# Patient Record
Sex: MALE | Race: WHITE
[De-identification: names, ages, dates, MRNs, and addresses within clinical notes are randomized per-mention and may not be internally consistent; named-entity substitution may affect disease eponyms.]

---

## 2017-10-27 ENCOUNTER — HOSPITAL ENCOUNTER (OUTPATIENT)
Dept: HOSPITAL 80 - CIMAGING | Age: 77
End: 2017-10-27
Attending: FAMILY MEDICINE
Payer: COMMERCIAL

## 2017-10-27 DIAGNOSIS — M19.012: Primary | ICD-10-CM

## 2017-10-28 ENCOUNTER — HOSPITAL ENCOUNTER (EMERGENCY)
Dept: HOSPITAL 80 - CED | Age: 77
Discharge: HOME | End: 2017-10-28
Payer: COMMERCIAL

## 2017-10-28 VITALS
RESPIRATION RATE: 16 BRPM | SYSTOLIC BLOOD PRESSURE: 120 MMHG | OXYGEN SATURATION: 93 % | DIASTOLIC BLOOD PRESSURE: 86 MMHG | HEART RATE: 61 BPM

## 2017-10-28 VITALS
RESPIRATION RATE: 18 BRPM | SYSTOLIC BLOOD PRESSURE: 180 MMHG | DIASTOLIC BLOOD PRESSURE: 94 MMHG | OXYGEN SATURATION: 94 % | TEMPERATURE: 98 F

## 2017-10-28 VITALS — HEART RATE: 60 BPM

## 2017-10-28 VITALS — TEMPERATURE: 97.7 F

## 2017-10-28 DIAGNOSIS — Z87.891: ICD-10-CM

## 2017-10-28 DIAGNOSIS — R07.9: Primary | ICD-10-CM

## 2017-10-28 DIAGNOSIS — M65.352: ICD-10-CM

## 2017-10-28 DIAGNOSIS — Z85.51: ICD-10-CM

## 2017-10-28 LAB
% IMMATURE GRANULYOCYTES: 0.2 % (ref 0–1.1)
ABSOLUTE IMMATURE GRANULOCYTES: 0.01 10^3/UL (ref 0–0.1)
ABSOLUTE NRBC COUNT: 0 10^3/UL (ref 0–0.01)
ADD DIFF?: NO
ADD MORPH?: NO
ADD SCAN?: NO
ALBUMIN SERPL-MCNC: 4.4 G/DL (ref 3.5–5)
ALP SERPL-CCNC: 64 IU/L (ref 38–126)
ALT SERPL-CCNC: 38 IU/L (ref 21–72)
ANION GAP SERPL CALC-SCNC: 10 MEQ/L (ref 8–16)
APTT BLD: 31 SEC (ref 23–38)
AST SERPL-CCNC: 31 IU/L (ref 17–59)
ATYPICAL LYMPHOCYTE FLAG: 0 (ref 0–99)
BILIRUB SERPL-MCNC: 1 MG/DL (ref 0.1–1.4)
BILIRUBIN-CONJUGATED: 0.4 MG/DL (ref 0–0.5)
BILIRUBIN-UNCONJUGATED: 0.6 MG/DL (ref 0–1.1)
CALCIUM SERPL-MCNC: 9.5 MG/DL (ref 8.5–10.4)
CHLORIDE SERPL-SCNC: 106 MEQ/L (ref 97–110)
CO2 SERPL-SCNC: 23 MEQ/L (ref 22–31)
CREAT SERPL-MCNC: 0.9 MG/DL (ref 0.7–1.3)
ERYTHROCYTE [DISTWIDTH] IN BLOOD BY AUTOMATED COUNT: 14 % (ref 11.5–15.2)
FRAGMENT RBC FLAG: 0 (ref 0–99)
GFR SERPL CREATININE-BSD FRML MDRD: > 60 ML/MIN/{1.73_M2}
GLUCOSE SERPL-MCNC: 106 MG/DL (ref 70–100)
HCT VFR BLD CALC: 38.8 % (ref 40–51)
HGB BLD-MCNC: 13 G/DL (ref 13.7–17.5)
INR PPP: 0.89 (ref 0.83–1.16)
LEFT SHIFT FLG: 0 (ref 0–99)
LIPEMIA HEMOLYSIS FLAG: 80 (ref 0–99)
MCH RBC BLDCO QN: 32.5 PG (ref 27.9–34.1)
MCHC RBC AUTO-ENTMCNC: 33.5 G/DL (ref 32.4–36.7)
MCV RBC AUTO: 97 FL (ref 81.5–99.8)
NRBC-AUTO%: 0 % (ref 0–0.2)
PLATELET # BLD: 218 10^3/UL (ref 150–400)
PLATELET CLUMPS FLAG: 10 (ref 0–99)
PMV BLD AUTO: 9.9 FL (ref 8.7–11.7)
POTASSIUM SERPL-SCNC: 4.3 MEQ/L (ref 3.5–5.2)
PROT SERPL-MCNC: 7.1 G/DL (ref 6.3–8.2)
PROTHROMBIN TIME: 11.8 SEC (ref 12–15)
RBC # BLD AUTO: 4 10^6/UL (ref 4.4–6.38)
SODIUM SERPL-SCNC: 139 MEQ/L (ref 134–144)
TROPONIN I SERPL-MCNC: < 0.012 NG/ML (ref 0–0.03)

## 2017-10-28 PROCEDURE — 99285 EMERGENCY DEPT VISIT HI MDM: CPT

## 2017-10-28 PROCEDURE — 71020: CPT

## 2017-10-28 PROCEDURE — 93005 ELECTROCARDIOGRAM TRACING: CPT

## 2017-10-28 PROCEDURE — 71275 CT ANGIOGRAPHY CHEST: CPT

## 2017-10-28 NOTE — CPEKG
Heart Rate: 59

RR Interval: 1017

P-R Interval: 156

QRSD Interval: 162

QT Interval: 448

QTC Interval: 444

P Axis: 41

QRS Axis: -71

T Wave Axis: 14

EKG Severity - ABNORMAL ECG -

EKG Impression: SINUS RHYTHM

EKG Impression: ATRIAL PREMATURE COMPLEX

EKG Impression: RBBB AND LAFB

Electronically Signed By: Jeremy Lacy 28-Oct-2017 12:30:22

## 2017-10-28 NOTE — EDPHY
H & P


Stated Complaint: chest pain for 1 week


Time Seen by Provider: 10/28/17 07:05


HPI/ROS: 





CHIEF COMPLAINT:  Chest pain





HISTORY OF PRESENT ILLNESS:  The patient is a 77-year-old man who comes to the 

emergency department complaining of chest pain for about 1 week.  He states 

that his symptoms are sharp and over his left breast and intermittent.  They 

seem to be exacerbated by marijuana use.  He used marijuana yesterday evening 

for the 1st time in several days.  He states that he is trying to cut down.  He 

had significant pain yesterday evening and then a single episode this morning 

about an hour and half ago was worse than typical.  They usually only last for 

few seconds.  No shortness of breath nausea or diaphoresis associated.  No 

lightheadedness.  No worsening with exertion.  No radiation.  He does have a 

history of high cholesterol.  No hypertension.  No recent travel leg pain or 

swelling.  No shortness of breath or pleurisy.  No recent fevers or illness.  

He denies having any cardiac history although he does have right bundle branch 

block with left anterior fascicular which he knew about previously.








REVIEW OF SYSTEMS:


Constitutional:  denies: chills, fever, recent illness, recent injury


EENTM: denies: blurred vision, double vision, nose congestion


Respiratory: denies: cough, shortness of breath


Cardiac:  See HPI


Gastrointestinal/Abdominal: denies: abdominal pain, diarrhea, nausea, vomiting, 

blood streaked stools


Genitourinary: denies: dysuria, frequency, hematuria, pain


Musculoskeletal: denies: joint pain, muscle pain


Skin: denies: lesions, rash, jaundice, bruising


Neurological: denies: headache, numbness, paresthesia, tingling, dizziness, 

weakness


Hematologic/Lymphatic: denies: blood clots, easy bleeding, easy bruising


Immunologic/allergic: denies: HIV/AIDS, transplant








EXAM:


GENERAL:  Well-appearing, well-nourished and in no acute distress.


HEAD:  Atraumatic, normocephalic.


EYES:  Pupils equal round and reactive to light, extraocular movements intact, 

sclera anicteric, conjunctiva are normal.


ENT:  TMs normal, nares patent, oropharynx clear without exudates.  Moist 

mucous membranes.


NECK:  Normal range of motion, supple without lymphadenopathy or JVD.


LUNGS:  Breath sounds clear to auscultation bilaterally and equal.  No wheezes 

rales or rhonchi.


HEART:  Regular rate and rhythm without murmurs, rubs or gallops.


ABDOMEN:  Soft, nontender, normoactive bowel sounds.  No guarding, no rebound.  

No masses appreciated.


BACK:  No CVA tenderness, no spinal tenderness, step-offs or deformities


EXTREMITIES:  Normal range of motion, no pitting or edema.  No clubbing or 

cyanosis.


NEUROLOGICAL:  Cranial nerves II through XII grossly intact.  Normal speech, 

normal gait.  5/5 strength, normal movement in all extremities, normal sensation


PSYCH:  Normal mood, normal affect.


SKIN:  Warm, dry, normal turgor, no visible rashes or lesions.














Source: Patient


Exam Limitations: No limitations





- Personal History


Current Tetanus Diphtheria and Acellular Pertussis (TDAP): Yes


Tetanus Vaccine Date: unsure





- Medical/Surgical History


Hx Asthma: No


Hx Chronic Respiratory Disease: No


Hx Diabetes: No


Hx Cardiac Disease: Yes


Hx Renal Disease: No


Hx Cirrhosis: No


Hx Alcoholism: No


Hx HIV/AIDS: No


Hx Splenectomy or Spleen Trauma: No


Other PMH: tib/fib, diverticulitis, bladder CA, INFLAMMATORY POLYARTHROPATHY, 

spinal fusion L3-L4, HIGH CHOLESTEROL, GASTRIC ULCER, SINUS SURG, TONSILS,APPY,

RBBB,arthritis





- Family History


Significant Family History: No pertinent family hx





- Social History


Smoking Status: Former smoker


Alcohol Use: Sober


Drug Use: None


Constitutional: 


 Initial Vital Signs











Heart Rate  68   10/28/17 06:52


 


Respiratory Rate  18   10/28/17 06:52


 


Blood Pressure  190/100 H  10/28/17 06:52


 


O2 Sat (%)  94   10/28/17 06:52








 











O2 Delivery Mode               Room Air














Allergies/Adverse Reactions: 


 





droperidol [From Inapsine] Allergy (Intermediate, Verified 10/28/17 06:59)


 AGITATION








Home Medications: 














 Medication  Instructions  Recorded


 


HYDROcodone/APAP 10/325 [Norco 1 tab PO BID PRN 12/09/13





10/325 (RX)]  


 


Omeprazole [Prilosec 40 mg] 40 mg PO BID 12/09/13


 


Atorvastatin Calcium  03/22/14














Medical Decision Making





- Diagnostics


EKG Interpretation: 





An EKG obtained and was read and documented in trace view.  Please see trace 

view for full reading and report.  Right bundle branch block and left anterior 

fascicular unchanged from previous 


Imaging Results: 


 Imaging Impressions





Chest X-Ray  10/28/17 07:15


Impression: Stable negative chest..








Chest/Thorax CTA  10/28/17 07:49


Impression:     Negative CT examination of the chest for acute pulmonary 

thromboembolic disease.


 


Results called to Dr. Jeremy Lacy at 8:30 AM at the time of the 

interpretation.











Imaging: Discussed imaging studies w/ On call Radiologist


ED Course/Re-evaluation: 





8:45 p.m. we had a long discussion about patient's testing thus far.  I 

recommended he stay for a 4 hour troponin however he declines.  He would like 

to go home.  He is currently asymptomatic.  He states that he will probably 

come back at 11 to have his troponin re-drawn then.  I explained to him that 

this is not typical and would require him having a 2nd visit and 2nd bill .  He 

states that that is fine with him.  I again Discouraged him from going home 

concern for if he worsens between then and now.  He persisted.  We discussed 

the fact that his orthopedic surgery on Monday will likely need to be postponed 

and tell he can follow up and have a stress test. 


Differential Diagnosis: 





Partial list of the Differential diagnosis considered include but were not 

limited to;  acute coronary disease, musculoskeletal pain, PE and although 

unlikely based on the history and physical exam, I also considered pneumonia, 

bronchitis, fracture.  I discussed these differential diagnoses and the plan 

with the patient as well as the usual and expected course.  The patient 

understands that the diagnosis is provisional and that in medicine we are not 

always correct and that further workup is often warranted.  Usual and customary 

warnings were given.  All of the patient's questions were answered.  The 

patient was instructed to return to the emergency department should the 

symptoms at all worsen or return, otherwise to followup with the physician as 

we discussed.





- Data Points


Laboratory Results: 


 Laboratory Results





 10/28/17 07:15 





 10/28/17 07:15 





 











  10/28/17 10/28/17 10/28/17





  07:15 07:15 07:15


 


WBC      5.41 10^3/uL 10^3/uL





     (3.80-9.50) 


 


RBC      4.00 10^6/uL L 10^6/uL





     (4.40-6.38) 


 


Hgb      13.0 g/dL L g/dL





     (13.7-17.5) 


 


Hct      38.8 % L %





     (40.0-51.0) 


 


MCV      97.0 fL fL





     (81.5-99.8) 


 


MCH      32.5 pg pg





     (27.9-34.1) 


 


MCHC      33.5 g/dL g/dL





     (32.4-36.7) 


 


RDW      14.0 % %





     (11.5-15.2) 


 


Plt Count      218 10^3/uL 10^3/uL





     (150-400) 


 


MPV      9.9 fL fL





     (8.7-11.7) 


 


Neut % (Auto)      54.2 % %





     (39.3-74.2) 


 


Lymph % (Auto)      26.2 % %





     (15.0-45.0) 


 


Mono % (Auto)      12.0 % %





     (4.5-13.0) 


 


Eos % (Auto)      6.5 % %





     (0.6-7.6) 


 


Baso % (Auto)      0.9 % %





     (0.3-1.7) 


 


Nucleat RBC Rel Count      0.0 % %





     (0.0-0.2) 


 


Absolute Neuts (auto)      2.93 10^3/uL 10^3/uL





     (1.70-6.50) 


 


Absolute Lymphs (auto)      1.42 10^3/uL 10^3/uL





     (1.00-3.00) 


 


Absolute Monos (auto)      0.65 10^3/uL 10^3/uL





     (0.30-0.80) 


 


Absolute Eos (auto)      0.35 10^3/uL 10^3/uL





     (0.03-0.40) 


 


Absolute Basos (auto)      0.05 10^3/uL 10^3/uL





     (0.02-0.10) 


 


Absolute Nucleated RBC      0.00 10^3/uL 10^3/uL





     (0-0.01) 


 


Immature Gran %      0.2 % %





     (0.0-1.1) 


 


Immature Gran #      0.01 10^3/uL 10^3/uL





     (0.00-0.10) 


 


PT  11.8 SEC L SEC    





   (12.0-15.0)   


 


INR  0.89     





   (0.83-1.16)   


 


APTT  31.0 SEC SEC    





   (23.0-38.0)   


 


D-Dimer  0.88 ug/mLFEU H ug/mLFEU    





   (0.00-0.50)   


 


Sodium    139 mEq/L mEq/L  





    (134-144)  


 


Potassium    4.3 mEq/L mEq/L  





    (3.5-5.2)  


 


Chloride    106 mEq/L mEq/L  





    ()  


 


Carbon Dioxide    23 mEq/l mEq/l  





    (22-31)  


 


Anion Gap    10 mEq/L mEq/L  





    (8-16)  


 


BUN    20 mg/dL mg/dL  





    (7-23)  


 


Creatinine    0.9 mg/dL mg/dL  





    (0.7-1.3)  


 


Estimated GFR    > 60   





    


 


Glucose    106 mg/dL H mg/dL  





    ()  


 


Calcium    9.5 mg/dL mg/dL  





    (8.5-10.4)  


 


Total Bilirubin    1.0 mg/dL mg/dL  





    (0.1-1.4)  


 


Conjugated Bilirubin    0.4 mg/dL mg/dL  





    (0.0-0.5)  


 


Unconjugated Bilirubin    0.6 mg/dL mg/dL  





    (0.0-1.1)  


 


AST    31 IU/L IU/L  





    (17-59)  


 


ALT    38 IU/L IU/L  





    (21-72)  


 


Alkaline Phosphatase    64 IU/L IU/L  





    ()  


 


Troponin I    < 0.012 ng/mL ng/mL  





    (0.000-0.034)  


 


Total Protein    7.1 g/dL g/dL  





    (6.3-8.2)  


 


Albumin    4.4 g/dL g/dL  





    (3.5-5.0)  


 


Lipase    126 IU/L IU/L  





    ()  











Medications Given: 


 








Discontinued Medications





Aspirin (Aspirin)  324 mg PO EDNOW ONE


   Stop: 10/28/17 07:04


   Last Admin: 10/28/17 07:07 Dose:  324 mg








Departure





- Departure


Disposition: Home, Routine, Self-Care


Clinical Impression: 


Chest pain


Qualifiers:


 Chest pain type: unspecified Qualified Code(s): R07.9 - Chest pain, unspecified





Condition: Fair


Instructions:  Chest Pain (ED)


Referrals: 


Tarik Nagel MD [Primary Care Provider] - As per Instructions

## 2017-10-28 NOTE — EDPHY
H & P


Time Seen by Provider: 10/28/17 11:24


HPI/ROS: 





CHIEF COMPLAINT:  Chest pain





HISTORY OF PRESENT ILLNESS:  Patient is a 77-year-old man who I saw a few hours 

ago complaining of chest pain for the last couple of days.  It is intermittent 

and sharp over his left breast.  He had a negative troponin as well as a CT 

angio of his chest that was negative.  His symptoms resolved.  I have 

recommended that he stay for a 4 hour delta troponin.  The patient declines 

this and stated that he would return.  He is now here for his 2nd troponin.  He 

is currently asymptomatic.








REVIEW OF SYSTEMS:


Constitutional:  denies: chills, fever, recent illness, recent injury


EENTM: denies: blurred vision, double vision, nose congestion


Respiratory: denies: cough, shortness of breath


Cardiac:  See HPI


Gastrointestinal/Abdominal: denies: abdominal pain, diarrhea, nausea, vomiting, 

blood streaked stools


Genitourinary: denies: dysuria, frequency, hematuria, pain


Musculoskeletal: denies: joint pain, muscle pain


Skin: denies: lesions, rash, jaundice, bruising


Neurological: denies: headache, numbness, paresthesia, tingling, dizziness, 

weakness


Hematologic/Lymphatic: denies: blood clots, easy bleeding, easy bruising


Immunologic/allergic: denies: HIV/AIDS, transplant








EXAM:


GENERAL:  Well-appearing, well-nourished and in no acute distress.


HEAD:  Atraumatic, normocephalic.


EYES:  Pupils equal round and reactive to light, extraocular movements intact, 

sclera anicteric, conjunctiva are normal.


ENT:  TMs normal, nares patent, oropharynx clear without exudates.  Moist 

mucous membranes.


NECK:  Normal range of motion, supple without lymphadenopathy or JVD.


LUNGS:  Breath sounds clear to auscultation bilaterally and equal.  No wheezes 

rales or rhonchi.


HEART:  Regular rate and rhythm without murmurs, rubs or gallops.


ABDOMEN:  Soft, nontender, normoactive bowel sounds.  No guarding, no rebound.  

No masses appreciated.


BACK:  No CVA tenderness, no spinal tenderness, step-offs or deformities


EXTREMITIES:  Normal range of motion, no pitting or edema.  No clubbing or 

cyanosis.


NEUROLOGICAL:  Cranial nerves II through XII grossly intact.  Normal speech, 

normal gait.  5/5 strength, normal movement in all extremities, normal sensation


PSYCH:  Normal mood, normal affect.


SKIN:  Warm, dry, normal turgor, no visible rashes or lesions.








Source: Patient


Exam Limitations: No limitations





- Personal History


Tetanus Vaccine Date: unsure





- Medical/Surgical History


Hx Asthma: No


Hx Chronic Respiratory Disease: No


Hx Diabetes: No


Hx Cardiac Disease: Yes


Hx Renal Disease: No


Hx Cirrhosis: No


Hx Alcoholism: No


Hx HIV/AIDS: No


Hx Splenectomy or Spleen Trauma: No


Other PMH: tib/fib, diverticulitis, bladder CA, INFLAMMATORY POLYARTHROPATHY, 

spinal fusion L3-L4, HIGH CHOLESTEROL, GASTRIC ULCER, SINUS SURG, TONSILS,APPY,

RBBB,arthritis





- Family History


Significant Family History: No pertinent family hx





- Social History


Smoking Status: Former smoker


Alcohol Use: Sober


Drug Use: Marijuana


Constitutional: 


 Initial Vital Signs











Temperature (C)  36.5 C   10/28/17 11:24


 


Heart Rate  61   10/28/17 11:24


 


Respiratory Rate  16   10/28/17 11:24


 


Blood Pressure  120/86 H  10/28/17 11:24


 


O2 Sat (%)  93   10/28/17 11:24








 











O2 Delivery Mode               Room Air














Allergies/Adverse Reactions: 


 





droperidol [From Inapsine] Allergy (Intermediate, Verified 10/28/17 11:27)


 AGITATION








Home Medications: 














 Medication  Instructions  Recorded


 


HYDROcodone/APAP 10/325 [Norco 1 tab PO BID PRN 12/09/13





10/325 (RX)]  


 


Omeprazole [Prilosec 40 mg] 40 mg PO BID 12/09/13


 


Atorvastatin Calcium  03/22/14














Medical Decision Making


ED Course/Re-evaluation: 





The patient also asked about some trigger finger symptoms in his left little 

finger.  He states that when he wakes up in the morning it is curled and that 

he has to manually straighten it and there is a pop.  He asked his primary 

about this who told him that x-rays would not help.  I agree.  He is able to 

use his finger with complete function and pain-free after he opens it in the 

morning.  I do suspected trigger finger and will refer him to Hand surgery.  He 

is happy with this plan.





12:40 p.m. the patient's repeat troponin and EKG are unremarkable.  He is eager 

to leave.  He feels completely well.  He will follow up with his primary about 

a stress test and will reschedule surgery.


Differential Diagnosis: 





Partial list of the Differential diagnosis considered include but were not 

limited to;  acute coronary disease, PE, arrhythmia, anxiety, muscle strain and 

although unlikely based on the history and physical exam, I also considered 

pneumothorax, pneumonia.  I discussed these differential diagnoses and the plan 

with the patient as well as the usual and expected course.  The patient 

understands that the diagnosis is provisional and that in medicine we are not 

always correct and that further workup is often warranted.  Usual and customary 

warnings were given.  All of the patient's questions were answered.  The 

patient was instructed to return to the emergency department should the 

symptoms at all worsen or return, otherwise to followup with the physician as 

we discussed.





Departure





- Departure


Disposition: Home, Routine, Self-Care


Clinical Impression: 


 Chest pain





Trigger finger of left hand


Qualifiers:


 Trigger finger location: little finger Qualified Code(s): M65.352 - Trigger 

finger, left little finger





Condition: Fair


Instructions:  Chest Pain (ED), Trigger Finger (ED)


Referrals: 


Tarik Nagel MD [Primary Care Provider] - As per Instructions


Brandin Troy MD [Medical Doctor] - As per Instructions

## 2017-10-28 NOTE — CPEKG
Heart Rate: 64

RR Interval: 938

P-R Interval: 160

QRSD Interval: 164

QT Interval: 452

QTC Interval: 467

P Axis: 57

QRS Axis: -75

T Wave Axis: 51

EKG Severity - ABNORMAL ECG -

EKG Impression: SINUS RHYTHM

EKG Impression: RBBB AND LAFB

Electronically Signed By: Jeremy Lacy 28-Oct-2017 07:18:51

## 2017-12-28 ENCOUNTER — HOSPITAL ENCOUNTER (OUTPATIENT)
Dept: HOSPITAL 80 - CIMAGING | Age: 77
End: 2017-12-28
Attending: PHYSICIAN ASSISTANT
Payer: COMMERCIAL

## 2017-12-28 DIAGNOSIS — M43.16: ICD-10-CM

## 2017-12-28 DIAGNOSIS — Z09: Primary | ICD-10-CM

## 2017-12-28 DIAGNOSIS — Z98.1: ICD-10-CM

## 2018-04-06 ENCOUNTER — HOSPITAL ENCOUNTER (OUTPATIENT)
Dept: HOSPITAL 80 - CIMAGING | Age: 78
End: 2018-04-06
Attending: PHYSICIAN ASSISTANT
Payer: COMMERCIAL

## 2018-04-06 DIAGNOSIS — Z98.1: ICD-10-CM

## 2018-04-06 DIAGNOSIS — M43.16: Primary | ICD-10-CM

## 2018-09-19 ENCOUNTER — HOSPITAL ENCOUNTER (OUTPATIENT)
Dept: HOSPITAL 80 - CIMAGING | Age: 78
End: 2018-09-19
Attending: PHYSICIAN ASSISTANT
Payer: COMMERCIAL

## 2018-09-19 DIAGNOSIS — Z98.1: ICD-10-CM

## 2018-09-19 DIAGNOSIS — Z47.89: Primary | ICD-10-CM

## 2018-10-31 ENCOUNTER — HOSPITAL ENCOUNTER (OUTPATIENT)
Dept: HOSPITAL 80 - CIMAGING | Age: 78
End: 2018-10-31
Attending: FAMILY MEDICINE
Payer: COMMERCIAL

## 2018-10-31 DIAGNOSIS — I70.0: ICD-10-CM

## 2018-10-31 DIAGNOSIS — R41.82: Primary | ICD-10-CM

## 2018-10-31 DIAGNOSIS — J32.9: ICD-10-CM

## 2018-11-02 ENCOUNTER — HOSPITAL ENCOUNTER (OUTPATIENT)
Dept: HOSPITAL 80 - CIMAGING | Age: 78
End: 2018-11-02
Attending: FAMILY MEDICINE
Payer: COMMERCIAL

## 2018-11-02 DIAGNOSIS — R16.0: ICD-10-CM

## 2018-11-02 DIAGNOSIS — N28.1: ICD-10-CM

## 2018-11-02 DIAGNOSIS — R94.5: Primary | ICD-10-CM

## 2018-11-05 ENCOUNTER — HOSPITAL ENCOUNTER (OUTPATIENT)
Dept: HOSPITAL 80 - CIMAGING | Age: 78
End: 2018-11-05
Attending: FAMILY MEDICINE
Payer: COMMERCIAL

## 2018-11-05 DIAGNOSIS — Z98.1: ICD-10-CM

## 2018-11-05 DIAGNOSIS — M54.5: Primary | ICD-10-CM

## 2019-01-30 ENCOUNTER — HOSPITAL ENCOUNTER (OUTPATIENT)
Dept: HOSPITAL 80 - CIMAGING | Age: 79
End: 2019-01-30
Attending: PHYSICIAN ASSISTANT
Payer: COMMERCIAL

## 2019-01-30 DIAGNOSIS — M51.26: Primary | ICD-10-CM

## 2019-01-30 DIAGNOSIS — Z98.1: ICD-10-CM

## 2019-01-30 DIAGNOSIS — M43.16: ICD-10-CM

## 2019-06-14 ENCOUNTER — HOSPITAL ENCOUNTER (OUTPATIENT)
Dept: HOSPITAL 80 - CIMAGING | Age: 79
End: 2019-06-14
Payer: COMMERCIAL